# Patient Record
Sex: MALE | URBAN - METROPOLITAN AREA
[De-identification: names, ages, dates, MRNs, and addresses within clinical notes are randomized per-mention and may not be internally consistent; named-entity substitution may affect disease eponyms.]

---

## 2021-04-03 ENCOUNTER — INPATIENT (INPATIENT)
Facility: HOSPITAL | Age: 71
LOS: 1 days | Discharge: HOME | DRG: 82 | End: 2021-04-05
Attending: SPECIALIST | Admitting: SPECIALIST
Payer: MEDICARE

## 2021-04-03 VITALS
HEART RATE: 87 BPM | DIASTOLIC BLOOD PRESSURE: 103 MMHG | WEIGHT: 164.91 LBS | SYSTOLIC BLOOD PRESSURE: 154 MMHG | HEIGHT: 67 IN | RESPIRATION RATE: 18 BRPM | TEMPERATURE: 99 F | OXYGEN SATURATION: 97 %

## 2021-04-03 DIAGNOSIS — I61.9 NONTRAUMATIC INTRACEREBRAL HEMORRHAGE, UNSPECIFIED: ICD-10-CM

## 2021-04-03 LAB
ALBUMIN SERPL ELPH-MCNC: 3.6 G/DL — SIGNIFICANT CHANGE UP (ref 3.3–5)
ALP SERPL-CCNC: 63 U/L — SIGNIFICANT CHANGE UP (ref 40–120)
ALT FLD-CCNC: 23 U/L — SIGNIFICANT CHANGE UP (ref 12–78)
ANION GAP SERPL CALC-SCNC: 3 MMOL/L — LOW (ref 5–17)
APPEARANCE UR: CLEAR — SIGNIFICANT CHANGE UP
AST SERPL-CCNC: 21 U/L — SIGNIFICANT CHANGE UP (ref 15–37)
BASOPHILS # BLD AUTO: 0.06 K/UL — SIGNIFICANT CHANGE UP (ref 0–0.2)
BASOPHILS NFR BLD AUTO: 1.1 % — SIGNIFICANT CHANGE UP (ref 0–2)
BILIRUB SERPL-MCNC: 0.5 MG/DL — SIGNIFICANT CHANGE UP (ref 0.2–1.2)
BILIRUB UR-MCNC: NEGATIVE — SIGNIFICANT CHANGE UP
BUN SERPL-MCNC: 12 MG/DL — SIGNIFICANT CHANGE UP (ref 7–23)
CALCIUM SERPL-MCNC: 8.2 MG/DL — LOW (ref 8.5–10.1)
CHLORIDE SERPL-SCNC: 109 MMOL/L — HIGH (ref 96–108)
CO2 SERPL-SCNC: 28 MMOL/L — SIGNIFICANT CHANGE UP (ref 22–31)
COLOR SPEC: YELLOW — SIGNIFICANT CHANGE UP
CREAT SERPL-MCNC: 0.99 MG/DL — SIGNIFICANT CHANGE UP (ref 0.5–1.3)
DIFF PNL FLD: NEGATIVE — SIGNIFICANT CHANGE UP
EOSINOPHIL # BLD AUTO: 0.15 K/UL — SIGNIFICANT CHANGE UP (ref 0–0.5)
EOSINOPHIL NFR BLD AUTO: 2.7 % — SIGNIFICANT CHANGE UP (ref 0–6)
ETHANOL SERPL-MCNC: <10 MG/DL — SIGNIFICANT CHANGE UP (ref 0–10)
GLUCOSE SERPL-MCNC: 99 MG/DL — SIGNIFICANT CHANGE UP (ref 70–99)
GLUCOSE UR QL: NEGATIVE MG/DL — SIGNIFICANT CHANGE UP
HCT VFR BLD CALC: 42.4 % — SIGNIFICANT CHANGE UP (ref 39–50)
HGB BLD-MCNC: 14.7 G/DL — SIGNIFICANT CHANGE UP (ref 13–17)
IMM GRANULOCYTES NFR BLD AUTO: 0.5 % — SIGNIFICANT CHANGE UP (ref 0–1.5)
KETONES UR-MCNC: NEGATIVE — SIGNIFICANT CHANGE UP
LEUKOCYTE ESTERASE UR-ACNC: NEGATIVE — SIGNIFICANT CHANGE UP
LIDOCAIN IGE QN: 125 U/L — SIGNIFICANT CHANGE UP (ref 73–393)
LYMPHOCYTES # BLD AUTO: 2.69 K/UL — SIGNIFICANT CHANGE UP (ref 1–3.3)
LYMPHOCYTES # BLD AUTO: 48.7 % — HIGH (ref 13–44)
MCHC RBC-ENTMCNC: 31.9 PG — SIGNIFICANT CHANGE UP (ref 27–34)
MCHC RBC-ENTMCNC: 34.7 GM/DL — SIGNIFICANT CHANGE UP (ref 32–36)
MCV RBC AUTO: 92 FL — SIGNIFICANT CHANGE UP (ref 80–100)
MONOCYTES # BLD AUTO: 0.4 K/UL — SIGNIFICANT CHANGE UP (ref 0–0.9)
MONOCYTES NFR BLD AUTO: 7.2 % — SIGNIFICANT CHANGE UP (ref 2–14)
NEUTROPHILS # BLD AUTO: 2.19 K/UL — SIGNIFICANT CHANGE UP (ref 1.8–7.4)
NEUTROPHILS NFR BLD AUTO: 39.8 % — LOW (ref 43–77)
NITRITE UR-MCNC: NEGATIVE — SIGNIFICANT CHANGE UP
PH UR: 8 — SIGNIFICANT CHANGE UP (ref 5–8)
PLATELET # BLD AUTO: 163 K/UL — SIGNIFICANT CHANGE UP (ref 150–400)
POTASSIUM SERPL-MCNC: 3.6 MMOL/L — SIGNIFICANT CHANGE UP (ref 3.5–5.3)
POTASSIUM SERPL-SCNC: 3.6 MMOL/L — SIGNIFICANT CHANGE UP (ref 3.5–5.3)
PROT SERPL-MCNC: 7.3 GM/DL — SIGNIFICANT CHANGE UP (ref 6–8.3)
PROT UR-MCNC: NEGATIVE MG/DL — SIGNIFICANT CHANGE UP
RBC # BLD: 4.61 M/UL — SIGNIFICANT CHANGE UP (ref 4.2–5.8)
RBC # FLD: 12.2 % — SIGNIFICANT CHANGE UP (ref 10.3–14.5)
SARS-COV-2 RNA SPEC QL NAA+PROBE: SIGNIFICANT CHANGE UP
SODIUM SERPL-SCNC: 140 MMOL/L — SIGNIFICANT CHANGE UP (ref 135–145)
SP GR SPEC: 1.01 — SIGNIFICANT CHANGE UP (ref 1.01–1.02)
UROBILINOGEN FLD QL: NEGATIVE MG/DL — SIGNIFICANT CHANGE UP
WBC # BLD: 5.52 K/UL — SIGNIFICANT CHANGE UP (ref 3.8–10.5)
WBC # FLD AUTO: 5.52 K/UL — SIGNIFICANT CHANGE UP (ref 3.8–10.5)

## 2021-04-03 PROCEDURE — 70450 CT HEAD/BRAIN W/O DYE: CPT | Mod: 26,77,59

## 2021-04-03 PROCEDURE — 71260 CT THORAX DX C+: CPT | Mod: 26

## 2021-04-03 PROCEDURE — 36415 COLL VENOUS BLD VENIPUNCTURE: CPT

## 2021-04-03 PROCEDURE — 85610 PROTHROMBIN TIME: CPT

## 2021-04-03 PROCEDURE — 97530 THERAPEUTIC ACTIVITIES: CPT | Mod: GP

## 2021-04-03 PROCEDURE — 71045 X-RAY EXAM CHEST 1 VIEW: CPT | Mod: 26

## 2021-04-03 PROCEDURE — 70496 CT ANGIOGRAPHY HEAD: CPT

## 2021-04-03 PROCEDURE — 86769 SARS-COV-2 COVID-19 ANTIBODY: CPT

## 2021-04-03 PROCEDURE — 72125 CT NECK SPINE W/O DYE: CPT | Mod: 26

## 2021-04-03 PROCEDURE — 80053 COMPREHEN METABOLIC PANEL: CPT

## 2021-04-03 PROCEDURE — 70450 CT HEAD/BRAIN W/O DYE: CPT

## 2021-04-03 PROCEDURE — 70498 CT ANGIOGRAPHY NECK: CPT

## 2021-04-03 PROCEDURE — 99232 SBSQ HOSP IP/OBS MODERATE 35: CPT

## 2021-04-03 PROCEDURE — 97163 PT EVAL HIGH COMPLEX 45 MIN: CPT | Mod: GP

## 2021-04-03 PROCEDURE — 93010 ELECTROCARDIOGRAM REPORT: CPT

## 2021-04-03 PROCEDURE — 85730 THROMBOPLASTIN TIME PARTIAL: CPT

## 2021-04-03 PROCEDURE — 84100 ASSAY OF PHOSPHORUS: CPT

## 2021-04-03 PROCEDURE — 70496 CT ANGIOGRAPHY HEAD: CPT | Mod: 26

## 2021-04-03 PROCEDURE — 97116 GAIT TRAINING THERAPY: CPT | Mod: GP

## 2021-04-03 PROCEDURE — 86900 BLOOD TYPING SEROLOGIC ABO: CPT

## 2021-04-03 PROCEDURE — 72170 X-RAY EXAM OF PELVIS: CPT | Mod: 26

## 2021-04-03 PROCEDURE — C9113: CPT

## 2021-04-03 PROCEDURE — 99221 1ST HOSP IP/OBS SF/LOW 40: CPT

## 2021-04-03 PROCEDURE — 86803 HEPATITIS C AB TEST: CPT

## 2021-04-03 PROCEDURE — 70498 CT ANGIOGRAPHY NECK: CPT | Mod: 26

## 2021-04-03 PROCEDURE — 70450 CT HEAD/BRAIN W/O DYE: CPT | Mod: 26,59

## 2021-04-03 PROCEDURE — 74177 CT ABD & PELVIS W/CONTRAST: CPT | Mod: 26

## 2021-04-03 PROCEDURE — 99291 CRITICAL CARE FIRST HOUR: CPT

## 2021-04-03 PROCEDURE — 86901 BLOOD TYPING SEROLOGIC RH(D): CPT

## 2021-04-03 PROCEDURE — 83735 ASSAY OF MAGNESIUM: CPT

## 2021-04-03 PROCEDURE — 85025 COMPLETE CBC W/AUTO DIFF WBC: CPT

## 2021-04-03 PROCEDURE — 86850 RBC ANTIBODY SCREEN: CPT

## 2021-04-03 RX ORDER — SODIUM CHLORIDE 9 MG/ML
500 INJECTION INTRAMUSCULAR; INTRAVENOUS; SUBCUTANEOUS ONCE
Refills: 0 | Status: COMPLETED | OUTPATIENT
Start: 2021-04-03 | End: 2021-04-03

## 2021-04-03 RX ORDER — ACETAMINOPHEN 500 MG
1000 TABLET ORAL ONCE
Refills: 0 | Status: COMPLETED | OUTPATIENT
Start: 2021-04-03 | End: 2021-04-03

## 2021-04-03 RX ORDER — ACETAMINOPHEN 500 MG
1000 TABLET ORAL ONCE
Refills: 0 | Status: DISCONTINUED | OUTPATIENT
Start: 2021-04-03 | End: 2021-04-05

## 2021-04-03 RX ORDER — ONDANSETRON 8 MG/1
4 TABLET, FILM COATED ORAL EVERY 6 HOURS
Refills: 0 | Status: DISCONTINUED | OUTPATIENT
Start: 2021-04-03 | End: 2021-04-05

## 2021-04-03 RX ORDER — SODIUM CHLORIDE 9 MG/ML
1000 INJECTION INTRAMUSCULAR; INTRAVENOUS; SUBCUTANEOUS
Refills: 0 | Status: DISCONTINUED | OUTPATIENT
Start: 2021-04-03 | End: 2021-04-04

## 2021-04-03 RX ORDER — PANTOPRAZOLE SODIUM 20 MG/1
40 TABLET, DELAYED RELEASE ORAL DAILY
Refills: 0 | Status: DISCONTINUED | OUTPATIENT
Start: 2021-04-03 | End: 2021-04-05

## 2021-04-03 RX ORDER — SENNA PLUS 8.6 MG/1
2 TABLET ORAL AT BEDTIME
Refills: 0 | Status: DISCONTINUED | OUTPATIENT
Start: 2021-04-03 | End: 2021-04-05

## 2021-04-03 RX ADMIN — PANTOPRAZOLE SODIUM 40 MILLIGRAM(S): 20 TABLET, DELAYED RELEASE ORAL at 16:29

## 2021-04-03 RX ADMIN — Medication 1 TABLET(S): at 19:24

## 2021-04-03 RX ADMIN — SODIUM CHLORIDE 75 MILLILITER(S): 9 INJECTION INTRAMUSCULAR; INTRAVENOUS; SUBCUTANEOUS at 16:29

## 2021-04-03 RX ADMIN — Medication 400 MILLIGRAM(S): at 15:00

## 2021-04-03 RX ADMIN — Medication 1000 MILLIGRAM(S): at 15:15

## 2021-04-03 RX ADMIN — SODIUM CHLORIDE 500 MILLILITER(S): 9 INJECTION INTRAMUSCULAR; INTRAVENOUS; SUBCUTANEOUS at 13:55

## 2021-04-03 RX ADMIN — SODIUM CHLORIDE 500 MILLILITER(S): 9 INJECTION INTRAMUSCULAR; INTRAVENOUS; SUBCUTANEOUS at 15:01

## 2021-04-03 RX ADMIN — Medication 1000 MILLIGRAM(S): at 15:30

## 2021-04-03 RX ADMIN — Medication 400 MILLIGRAM(S): at 21:25

## 2021-04-03 RX ADMIN — Medication 1000 MILLIGRAM(S): at 21:55

## 2021-04-03 NOTE — ED PROVIDER NOTE - OBJECTIVE STATEMENT
69 y/o male presents to the ED s/p fall. Per EMS, pt fell off a 6 foot ladder with +LOC. Report was that he was alert upon arrival to ED. Trauma alert activated and pt taken urgently to CAT scan which is + for bleed. 69 y/o male with PMHx of HTN presents to the ED s/p fall. Per EMS, pt fell off a 6 foot ladder with +LOC. Report was that he was alert upon arrival to ED. Trauma alert activated and pt taken urgently to CAT scan which is + for bleed. Pt is here with complaints of posterior head pain which is described as dull. Pt is unsure what happened, and does not remember anything after falling. Not on any blood thinners. Is on HTN medications. Denies any Hx of cancer. Denies nausea/vomiting, changes to vision, numbness/tingling in hands/feet. PCP: Dr. Chacho Urias in [place]. NKDA.  Mandarin interpret used, id#[id number].  Pt daughter who speaks English can be reached at: [phone].

## 2021-04-03 NOTE — H&P ADULT - NSHPPHYSICALEXAM_GEN_ALL_CORE
Vital Signs Last 24 Hrs  T(C): 37 (03 Apr 2021 13:33), Max: 37 (03 Apr 2021 13:33)  T(F): 98.6 (03 Apr 2021 13:33), Max: 98.6 (03 Apr 2021 13:33)  HR: 93 (03 Apr 2021 15:01) (87 - 93)  BP: 160/110 (03 Apr 2021 15:01) (154/103 - 160/110)  BP(mean): 123 (03 Apr 2021 15:01) (123 - 123)  RR: 18 (03 Apr 2021 15:01) (18 - 18)  SpO2: 98% (03 Apr 2021 15:01) (97% - 98%)    Constitutional: awake and alert, NAD   HEENT: PERRLA, EOMI  Neck: Supple  Respiratory: Breath sounds are clear bilaterally  Cardiovascular: S1 and S2, regular rhythm  Gastrointestinal: soft, nontender  Extremities:  no edema  Vascular: Carotid Bruit - no  Musculoskeletal: no joint swelling/tenderness, no abnormal movements  Skin: small abrasion to the back of his head -- occiput     Neurological exam:  HF: A x O x 3. Appropriately interactive, normal affect. fluent speech in Mandrin,  no Aphasia or paraphasic errors. Naming/repetition intact   CN: SALOME, EOMI, VFF, facial sensation normal, no NLFD, tongue midline  Motor: No pronator drift, Strength 5/5 in all 4 ext, normal bulk and tone, no tremor, rigidity or bradykinesia.    Sens: Intact to light touch  Reflexes: Symmetric and normal . BJ 2+, BR 2+, KJ 2+, AJ 2+, downgoing toes b/l  Coord:  No FNFA, dysmetria, LAUREL intact   Gait/Balance: Not tested

## 2021-04-03 NOTE — PATIENT PROFILE ADULT - PUBLIC BENEFITS
PHYSICIAN NEXT STEPS:  Review Only    CHIEF COMPLAINT:  Chief Complaint/Protocol Used: No Contact or Duplicate Contact Call  Onset: n/a      ASSESSMENT:  ? Onset: n/a  ? Normal True  -------------------------------------------------------    DISPOSITION:  Disposition Recommendation: Duplicate Contact Calls  Questions that led to disposition:  ? Caller has already spoken with another triager and has no further questions.  Patient Directed To: Unspecified  Patient Intended Action: Other        DISPOSITION OVERRIDE/PROVIDER CONSULT:  Disposition Override: N/A  Override Source: Unspecified  Consulted with PCP: No  Consulted with On-Call Physician: No    CALLER CONTACT INFO:  Name: Stacie Solis (Self)  Phone 1: [phone] (Home Phone)  Phone 2: [phone] (Mobile)      ENCOUNTER STARTED:  11/14/20 11:29:40 AM  ENCOUNTER ASSIGNED TO/CLOSED BY:  Rafael Brandt @ 11/14/20 11:31:13 AM      -------------------------------------------------------    UNDERSTANDS CARE ADVICE: Yes    AGREES WITH CARE ADVICE: Yes    WILL FOLLOW CARE ADVICE: Yes    -------------------------------------------------------   no

## 2021-04-03 NOTE — H&P ADULT - NSHPLABSRESULTS_GEN_ALL_CORE
< from: CT Head No Cont (04.03.21 @ 14:06) >  Multiple abnormal foci of intraparenchymal hemorrhage including focal hemorrhage in the right globus pallidus with surrounding edema and mass effect (5:22), parafalcine and left tentorial leaflet subdural hemorrhage, as well as multiple and temporal hemorrhagic contusions with subarachnoid hemorrhage and abnormal increased attenuation hemorrhage along the right M1 MCA branch vessels, MRI MRA and/or CTA may better assess.    Volume loss, microvascular disease, right parafalcine 8 x 5.9 mm slight increased attenuation may reflect posttraumatic hemorrhage, mass not excluded. Nonspecific white matter decreased attenuation likely microvascular disease, MRI more sensitive for new ischemia. If symptoms persist consider follow-up head CT or MRI if no contraindication.    Straightened cervical lordosis with patchy areas of lucencies and sclerosis throughout the vertebra, with right paraspinal soft tissue swelling, no prevertebral soft tissue swelling. Multilevel degenerative change, as detailed above, canal contents are suboptimally evaluated on CT, MRI may be obtained for persistence of spine pain as clinically warranted.    04-03    140  |  109<H>  |  12  ----------------------------<  99  3.6   |  28  |  0.99                          14.7   5.52  )-----------( 163      ( 03 Apr 2021 13:43 )             42.4

## 2021-04-03 NOTE — ED ADULT NURSE NOTE - OBJECTIVE STATEMENT
BIBA s/p fall off 6ft ladder, unwitnessed, possible LOC, denies blood thinners. see trauma flow sheet

## 2021-04-03 NOTE — ED ADULT NURSE NOTE - NSIMPLEMENTINTERV_GEN_ALL_ED
Implemented All Fall Risk Interventions:  [place] to call system. Call bell, personal items and telephone within reach. Instruct patient to call for assistance. Room bathroom lighting operational. Non-slip footwear when patient is off stretcher. Physically safe environment: no spills, clutter or unnecessary equipment. Stretcher in lowest position, wheels locked, appropriate side rails in place. Provide visual cue, wrist band, yellow gown, etc. Monitor gait and stability. Monitor for mental status changes and reorient to person, place, and time. Review medications for side effects contributing to fall risk. Reinforce activity limits and safety measures with patient and family.

## 2021-04-03 NOTE — H&P ADULT - ASSESSMENT
70 year old man with a h/o HTN  presents to the ED s/p unwitnessed fall from a ladder     CT head shows multiple intraparencymal hemorrhages with mass effect and surrounding cerebral edema  Left tentoral subdural hematoma  multiple temporal contusions with SAH    #TBI  -CTA of the head now to better assess right M1 MCA branch vessels  -Repeat CT in 4 hours   -Admit to the ICU under Dr. Fierro's service   -Q1 Hour neuro checks   -Keep NPO   -Daughter is reroute with pts medications, will update Med rec at that time   -thus far daughter and patient denies any use of asa/plavix/coumadin/DOAC medication  -Venodynes for DVT PPX

## 2021-04-03 NOTE — ED PROVIDER NOTE - CRITICAL CARE ATTENDING CONTRIBUTION TO CARE
patient s/p fall from a 6 foot ladder with reported loc. TA activated. + bleed. no AC. full code. Nsx at bedside. will admit to ICU,

## 2021-04-03 NOTE — PROGRESS NOTE ADULT - MENTAL STATUS
- Refractive goal: [facility] gcs 15 moves all extremiteis strongly

## 2021-04-03 NOTE — ED PROVIDER NOTE - PROGRESS NOTE DETAILS
Abrahamibtania HUANG for Dr. Cortes  Neurosurgery team at bedside. Recommending CTA head/neck. Will order and admit to Dr. Fierro, neurosurgery ICU. Corina HUANG for Dr. Cortes   Spoke to pt daughter to update her with pt status.

## 2021-04-03 NOTE — H&P ADULT - HISTORY OF PRESENT ILLNESS
Neuro surgery called to see the patient at 2:10PM after the ED attending got the preliminary read on the CT head  No Code Trauma was called   Pt seen at 2:25PM with the ED attending at bedside   GCS 15 at initial exam E:4, V:5, M:6  Dr. Fierro contacted at 2:20 to review the CT imaging     Pt is a 70 year old man with a PMH of HTN who presented to the ED after a fall off a ladder.   Patient has limited English and the [place]  phone was used.   Pt was interviewed with ED attending.  Pt does not remember the event leading up to his hospitalization.  Daughter was called, [phone], she states her father was trying to remove a tree from the sidewalk and that was why he was on the ladder.   Fall was not witnessed. Unknown LOC.     On exam patient is awake and alert, he follows commands, he c/o pain to the back of his head and his left hip.   He denies nausea or vomiting. He denies visual changes. He denies numbness or weakness.   GCS 15

## 2021-04-03 NOTE — ED PROVIDER NOTE - CLINICAL SUMMARY MEDICAL DECISION MAKING FREE TEXT BOX
71 y/o male with fall and intracranial bleeding. no ton blood thinners. Full code. AAOx3 and no neurological deficits currently. Neurosurgical evaluation and pain control.

## 2021-04-03 NOTE — ED ADULT TRIAGE NOTE - CHIEF COMPLAINT QUOTE
pt presents to ED s/p fall off 6 foot ladder with + LOC. pt denies blood thinners. complaints of posterior head pain. TA called at 1333.

## 2021-04-03 NOTE — ED PROVIDER NOTE - PMH
No pertinent past medical history <<----- Click to add NO pertinent Past Medical History HTN (hypertension)

## 2021-04-04 LAB
ALBUMIN SERPL ELPH-MCNC: 3.2 G/DL — LOW (ref 3.3–5)
ALP SERPL-CCNC: 62 U/L — SIGNIFICANT CHANGE UP (ref 40–120)
ALT FLD-CCNC: 19 U/L — SIGNIFICANT CHANGE UP (ref 12–78)
ANION GAP SERPL CALC-SCNC: 8 MMOL/L — SIGNIFICANT CHANGE UP (ref 5–17)
AST SERPL-CCNC: 18 U/L — SIGNIFICANT CHANGE UP (ref 15–37)
BASOPHILS # BLD AUTO: 0.02 K/UL — SIGNIFICANT CHANGE UP (ref 0–0.2)
BASOPHILS NFR BLD AUTO: 0.2 % — SIGNIFICANT CHANGE UP (ref 0–2)
BILIRUB SERPL-MCNC: 1.1 MG/DL — SIGNIFICANT CHANGE UP (ref 0.2–1.2)
BUN SERPL-MCNC: 13 MG/DL — SIGNIFICANT CHANGE UP (ref 7–23)
CALCIUM SERPL-MCNC: 7.8 MG/DL — LOW (ref 8.5–10.1)
CHLORIDE SERPL-SCNC: 108 MMOL/L — SIGNIFICANT CHANGE UP (ref 96–108)
CO2 SERPL-SCNC: 23 MMOL/L — SIGNIFICANT CHANGE UP (ref 22–31)
COVID-19 SPIKE DOMAIN AB INTERP: NEGATIVE — SIGNIFICANT CHANGE UP
COVID-19 SPIKE DOMAIN ANTIBODY RESULT: 0.4 U/ML — SIGNIFICANT CHANGE UP
CREAT SERPL-MCNC: 0.83 MG/DL — SIGNIFICANT CHANGE UP (ref 0.5–1.3)
EOSINOPHIL # BLD AUTO: 0.01 K/UL — SIGNIFICANT CHANGE UP (ref 0–0.5)
EOSINOPHIL NFR BLD AUTO: 0.1 % — SIGNIFICANT CHANGE UP (ref 0–6)
GLUCOSE SERPL-MCNC: 90 MG/DL — SIGNIFICANT CHANGE UP (ref 70–99)
HCT VFR BLD CALC: 38.7 % — LOW (ref 39–50)
HCV AB S/CO SERPL IA: 0.07 S/CO — SIGNIFICANT CHANGE UP (ref 0–0.99)
HCV AB SERPL-IMP: SIGNIFICANT CHANGE UP
HGB BLD-MCNC: 13.7 G/DL — SIGNIFICANT CHANGE UP (ref 13–17)
IMM GRANULOCYTES NFR BLD AUTO: 0.2 % — SIGNIFICANT CHANGE UP (ref 0–1.5)
LYMPHOCYTES # BLD AUTO: 1.78 K/UL — SIGNIFICANT CHANGE UP (ref 1–3.3)
LYMPHOCYTES # BLD AUTO: 19.3 % — SIGNIFICANT CHANGE UP (ref 13–44)
MAGNESIUM SERPL-MCNC: 2.1 MG/DL — SIGNIFICANT CHANGE UP (ref 1.6–2.6)
MCHC RBC-ENTMCNC: 31.9 PG — SIGNIFICANT CHANGE UP (ref 27–34)
MCHC RBC-ENTMCNC: 35.4 GM/DL — SIGNIFICANT CHANGE UP (ref 32–36)
MCV RBC AUTO: 90.2 FL — SIGNIFICANT CHANGE UP (ref 80–100)
MONOCYTES # BLD AUTO: 0.55 K/UL — SIGNIFICANT CHANGE UP (ref 0–0.9)
MONOCYTES NFR BLD AUTO: 6 % — SIGNIFICANT CHANGE UP (ref 2–14)
NEUTROPHILS # BLD AUTO: 6.85 K/UL — SIGNIFICANT CHANGE UP (ref 1.8–7.4)
NEUTROPHILS NFR BLD AUTO: 74.2 % — SIGNIFICANT CHANGE UP (ref 43–77)
PHOSPHATE SERPL-MCNC: 2.7 MG/DL — SIGNIFICANT CHANGE UP (ref 2.5–4.5)
PLATELET # BLD AUTO: 148 K/UL — LOW (ref 150–400)
POTASSIUM SERPL-MCNC: 3.6 MMOL/L — SIGNIFICANT CHANGE UP (ref 3.5–5.3)
POTASSIUM SERPL-SCNC: 3.6 MMOL/L — SIGNIFICANT CHANGE UP (ref 3.5–5.3)
PROT SERPL-MCNC: 6.5 GM/DL — SIGNIFICANT CHANGE UP (ref 6–8.3)
RBC # BLD: 4.29 M/UL — SIGNIFICANT CHANGE UP (ref 4.2–5.8)
RBC # FLD: 12 % — SIGNIFICANT CHANGE UP (ref 10.3–14.5)
SARS-COV-2 IGG+IGM SERPL QL IA: 0.4 U/ML — SIGNIFICANT CHANGE UP
SARS-COV-2 IGG+IGM SERPL QL IA: NEGATIVE — SIGNIFICANT CHANGE UP
SODIUM SERPL-SCNC: 139 MMOL/L — SIGNIFICANT CHANGE UP (ref 135–145)
WBC # BLD: 9.23 K/UL — SIGNIFICANT CHANGE UP (ref 3.8–10.5)
WBC # FLD AUTO: 9.23 K/UL — SIGNIFICANT CHANGE UP (ref 3.8–10.5)

## 2021-04-04 PROCEDURE — 99232 SBSQ HOSP IP/OBS MODERATE 35: CPT

## 2021-04-04 RX ORDER — ACETAMINOPHEN 500 MG
1000 TABLET ORAL ONCE
Refills: 0 | Status: COMPLETED | OUTPATIENT
Start: 2021-04-04 | End: 2021-04-04

## 2021-04-04 RX ADMIN — Medication 400 MILLIGRAM(S): at 06:12

## 2021-04-04 RX ADMIN — Medication 1 TABLET(S): at 10:27

## 2021-04-04 RX ADMIN — Medication 1000 MILLIGRAM(S): at 06:32

## 2021-04-04 RX ADMIN — PANTOPRAZOLE SODIUM 40 MILLIGRAM(S): 20 TABLET, DELAYED RELEASE ORAL at 10:27

## 2021-04-04 RX ADMIN — Medication 1000 MILLIGRAM(S): at 10:50

## 2021-04-04 RX ADMIN — Medication 400 MILLIGRAM(S): at 10:27

## 2021-04-04 RX ADMIN — SODIUM CHLORIDE 75 MILLILITER(S): 9 INJECTION INTRAMUSCULAR; INTRAVENOUS; SUBCUTANEOUS at 05:58

## 2021-04-04 NOTE — PHYSICAL THERAPY INITIAL EVALUATION ADULT - GAIT DEVIATIONS NOTED, PT EVAL
decreased bright/increased time in double stance/decreased velocity of limb motion/decreased step length/decreased stride length/decreased swing-to-stance ratio/decreased weight-shifting ability

## 2021-04-04 NOTE — PROGRESS NOTE ADULT - SUBJECTIVE AND OBJECTIVE BOX
Patient is a 70y old  Male who presents with a chief complaint of Traumatic Brain Injury (04 Apr 2021 10:29)      HPI:  Neuro surgery called to see the patient at 2:10PM after the ED attending got the preliminary read on the CT head. No Code Trauma was called. Pt seen at 2:25PM with the ED attending at bedside. GCS 15 at initial exam E:4, V:5, M:6. Dr. Fierro contacted at 2:20 to review the CT imaging. Pt is a 70 year old man with a PMH of HTN who presented to the ED after a fall off a ladder. Patient has limited English and the [place]  phone was used. Pt was interviewed with ED attending. Pt does not remember the event leading up to his hospitalization. Daughter was called, [phone], she states her father was trying to remove a tree from the sidewalk and that was why he was on the ladder. Fall was not witnessed. Unknown LOC. On exam patient is awake and alert, he follows commands, he c/o pain to the back of his head and his left hip. He denies nausea or vomiting. He denies visual changes. He denies numbness or weakness. Ct head sig for multiple small IPH, scattered SAH, falx/tent SDH    4/4 - Pt seen earlier today, sitting up in bed eating breakfast in NAD. Rpt CT head stable. No overnight events      acetaminophen  IVPB .. 1000 milliGRAM(s) IV Intermittent once PRN  multivitamin 1 Tablet(s) Oral daily  ondansetron Injectable 4 milliGRAM(s) IV Push every 6 hours PRN  pantoprazole  Injectable 40 milliGRAM(s) IV Push daily  senna 2 Tablet(s) Oral at bedtime PRN      Vital Signs Last 24 Hrs  T(C): 36.8 (04 Apr 2021 06:15), Max: 37.6 (03 Apr 2021 17:00)  T(F): 98.2 (04 Apr 2021 06:15), Max: 99.7 (03 Apr 2021 17:00)  HR: 89 (04 Apr 2021 11:00) (87 - 104)  BP: 138/70 (04 Apr 2021 11:00) (106/68 - 160/110)  BP(mean): 88 (04 Apr 2021 11:00) (73 - 123)  RR: 15 (04 Apr 2021 11:00) (11 - 28)  SpO2: 99% (04 Apr 2021 11:00) (93% - 100%)        Constitutional: awake and alert, NAD   Neck: Supple  Musculoskeletal: no abnormal movements    Neurological exam as per RN using  phone:  HF: A x O x 3. Appropriately interactive, normal affect. fluent speech in Mandrin   CN: SALOME, EOMI,facial sensation normal, no NLFD, tongue midline  Motor: No pronator drift, Strength 5/5 in all 4 ext, normal bulk and tone, no tremor, rigidity or bradykinesia.    Sens: Intact to light touch  Gait/Balance: Not tested                            13.7   9.23  )-----------( 148      ( 04 Apr 2021 06:35 )             38.7     139  |  108  |  13  ----------------------------<  90  3.6   |  23  |  0.83    Ca    7.8<L>      04 Apr 2021 06:35  Phos  2.7     04-04  Mg     2.1     04-04    TPro  6.5  /  Alb  3.2<L>  /  TBili  1.1  /  DBili  x   /  AST  18  /  ALT  19  /  AlkPhos  62  04-04    PT/INR - ( 03 Apr 2021 15:32 )   PT: 11.5 sec;   INR: 0.98 ratio      PTT - ( 03 Apr 2021 15:32 )  PTT:29.8 sec      Radiology:  CT Head No Cont (04.03.21 @ 20:55) >  Small focus of high attenuation involving the right temporal region is identified. The rest of this study appears unchanged when compared with the prior exam.  
HPI:     Pt is a 70 year old man with a PMH of HTN who presented to the ED after a fall off a ladder.   Pt does not remember the event leading up to his hospitalization. Fall was not witnessed. Unknown LOC. In the ED patient, CT head showed right basal ganglia bleed, with subdural hematoma, cta showed no abnormality. Repeat CT head 4/3 8Pm revealed no acute change from prior imaging.      4/4 Today patient was seen at bedside. Patient is awake and alert and follows commands. Patient complaining pain to the back of his head and his left hip. Patient reports wanting to eat today. He denies nausea or vomiting. He denies visual changes. He denies numbness or weakness.       REVIEW OF SYSTEMS:    CONSTITUTIONAL: No weakness, fevers or chills  EYES/ENT: No visual changes;  No vertigo or throat pain   NECK: Pin point Occipital pain. Denies stiffness  RESPIRATORY: No cough, wheezing, hemoptysis; No shortness of breath  CARDIOVASCULAR: No chest pain or palpitations  GASTROINTESTINAL: No abdominal or epigastric pain. No nausea, vomiting, or hematemesis; No diarrhea or constipation. No melena or hematochezia.  GENITOURINARY: No dysuria, frequency or hematuria  NEUROLOGICAL: No numbness or weakness  SKIN: No itching, rashes    MEDICATIONS  (STANDING):  multivitamin 1 Tablet(s) Oral daily  pantoprazole  Injectable 40 milliGRAM(s) IV Push daily  sodium chloride 0.9%. 1000 milliLiter(s) (75 mL/Hr) IV Continuous <Continuous>    MEDICATIONS  (PRN):  acetaminophen  IVPB .. 1000 milliGRAM(s) IV Intermittent once PRN Moderate Pain (4 - 6)  ondansetron Injectable 4 milliGRAM(s) IV Push every 6 hours PRN Nausea and/or Vomiting  senna 2 Tablet(s) Oral at bedtime PRN Constipation      Height (cm): 170.2 (04-03 @ 13:33)  Weight (kg): 74.8 (04-03 @ 13:33)  BMI (kg/m2): 25.8 (04-03 @ 13:33)    ICU Vital Signs Last 24 Hrs  T(C): 36.8 (04 Apr 2021 06:15), Max: 37.6 (03 Apr 2021 17:00)  T(F): 98.2 (04 Apr 2021 06:15), Max: 99.7 (03 Apr 2021 17:00)  HR: 90 (04 Apr 2021 10:00) (87 - 104)  BP: 106/68 (04 Apr 2021 10:00) (106/68 - 160/110)  BP(mean): 76 (04 Apr 2021 10:00) (73 - 123)  RR: 14 (04 Apr 2021 10:00) (11 - 28)  SpO2: 99% (04 Apr 2021 10:00) (93% - 100%)  I&O's Summary                    `             13.7   9.23  )-----------( 148      ( 04 Apr 2021 06:35 )             38.7         `04-04    139  |  108  |  13  ----------------------------<  90  3.6   |  23  |  0.83    Ca    7.8<L>      04 Apr 2021 06:35  Phos  2.7     04-04  Mg     2.1     04-04    TPro  6.5  /  Alb  3.2<L>  /  TBili  1.1  /  DBili  x   /  AST  18  /  ALT  19  /  AlkPhos  62  04-04          DVT Prophylaxis:  Venodynes                                                               Advanced Directives: full code                     
asked to see patient by Dr. Fierro    HPI:     Pt is a 70 year old man with a PMH of HTN who presented to the ED after a fall off a ladder.   Pt does not remember the event leading up to his hospitalization.  Daughter was called, [phone], she states her father was trying to remove a tree from the sidewalk and that was why he was on the ladder.   Fall was not witnessed. Unknown LOC.    patient is awake and alert, he follows commands, he c/o pain to the back of his head and his left hip.   He denies nausea or vomiting. He denies visual changes. He denies numbness or weakness.   GCS 15   CT head showed right basal ganglia bleed, with subdural hematoma, cta showed no abnormality    Review of Systems:  Other Review of Systems: All other review of systems negative, except as noted in HPI       MEDICATIONS  (STANDING):  multivitamin 1 Tablet(s) Oral daily  pantoprazole  Injectable 40 milliGRAM(s) IV Push daily  sodium chloride 0.9%. 1000 milliLiter(s) (75 mL/Hr) IV Continuous <Continuous>    MEDICATIONS  (PRN):  acetaminophen  IVPB .. 1000 milliGRAM(s) IV Intermittent once PRN Moderate Pain (4 - 6)  ondansetron Injectable 4 milliGRAM(s) IV Push every 6 hours PRN Nausea and/or Vomiting  senna 2 Tablet(s) Oral at bedtime PRN Constipation      Height (cm): 170.2 ( @ 13:33)  Weight (kg): 74.8 ( @ 13:33)  BMI (kg/m2): 25.8 ( @ 13:33)    ICU Vital Signs Last 24 Hrs  T(C): 37 (2021 13:33), Max: 37 (2021 13:33)  T(F): 98.6 (2021 13:33), Max: 98.6 (2021 13:33)  HR: 93 (2021 15:01) (87 - 93)  BP: 160/110 (2021 15:01) (154/103 - 160/110)  BP(mean): 123 (2021 15:01) (123 - 123)  ABP: --  ABP(mean): --  RR: 18 (2021 15:01) (18 - 18)  SpO2: 98% (2021 15:01) (97% - 98%)    I&O's Summary                        14.7   5.52  )-----------( 163      ( 2021 13:43 )             42.4       04-03    140  |  109<H>  |  12  ----------------------------<  99  3.6   |  28  |  0.99    Ca    8.2<L>      2021 13:43    TPro  7.3  /  Alb  3.6  /  TBili  0.5  /  DBili  x   /  AST  21  /  ALT  23  /  AlkPhos  63  04-03    rinalysis Basic - ( 2021 13:43 )    Color: Yellow / Appearance: Clear / S.010 / pH: x  Gluc: x / Ketone: Negative  / Bili: Negative / Urobili: Negative mg/dL   Blood: x / Protein: Negative mg/dL / Nitrite: Negative   Leuk Esterase: Negative / RBC: x / WBC x   Sq Epi: x / Non Sq Epi: x / Bacteria: x        DVT Prophylaxis:  Venodynes                                                               Advanced Directives: full code

## 2021-04-04 NOTE — PHYSICAL THERAPY INITIAL EVALUATION ADULT - GENERAL OBSERVATIONS, REHAB EVAL
Pt received resting in bed. Mandarin only. Vitals monitored in ICU. This PT speaks Mandarin. Translated for RN. RN reported fall caused ICH. Pt can be transferred out of ICU and possibly home tomorrow. Pt was able to walk near bed. - Sharpened Romberg EC. Pt complained of R posterior head discomfort. Contusion with scab. Scant blood stain on pillow case. Pt complained of ear ringing/discomfort. Explained to pt that he had a temporal contusion.

## 2021-04-04 NOTE — PHYSICAL THERAPY INITIAL EVALUATION ADULT - PERTINENT HX OF CURRENT PROBLEM, REHAB EVAL
Pt was up on ladder trimming a tree branch damaged by a truck. Wife found him on the ground. +LOC. CT R basal ganglia bleed, temporal contusion, SDH, SAH.

## 2021-04-04 NOTE — PHYSICAL THERAPY INITIAL EVALUATION ADULT - IMPAIRMENTS FOUND, PT EVAL
aerobic capacity/endurance/gait, locomotion, and balance/gross motor/integumentary integrity/muscle strength

## 2021-04-04 NOTE — PHYSICAL THERAPY INITIAL EVALUATION ADULT - PLANNED THERAPY INTERVENTIONS, PT EVAL
balance training/bed mobility training/gait training/lumbar stabilization/manual therapy techniques/motor coordination training/neuromuscular re-education/ROM/strengthening/stretching/transfer training

## 2021-04-04 NOTE — PROGRESS NOTE ADULT - ASSESSMENT
70 year old man PMH of HTN who presented to the ED after a fall off a ladder. Pt is amnestic to event. As per daughter, her father was trying to remove a tree from the sidewalk when he fell. Fall was not witnessed, ? LOC. GCS 15 in ER. CT head sig for multiple small IPH, scattered SAH, falx/tent SDH, stable on follow up scan    - No acute neurosurgical intervention   - Neuros q 8 hours  - Vitals q 4 hours  - Tylenol for pain  - Advance diet  - May transfer to floor  - Possible d/c home in AM  - Danita    Discussed with Dr Fierro  
70 year old man with a h/o HTN  presents to the ED s/p unwitnessed fall from a ladder       Neuro  ICH 2/2 to fall  -CT head on admission showed multiple intraparenchymal hemorrhages with mass effect and surrounding cerebral edema. Left tentorial subdural hematoma, multiple temporal contusions with SAH  -CTA no occlusions.  -Repeat head ct 4/3 @ 8 pm was unchanged. Neurochecks unchanged since admission. GCS 15  -Advance diet today  -BP well controlled  this AM  -Order PT  -Coags WNL  -Consider ordering MRI    Cardio  -HTN- Controlled     GI  Cont PPI PRn for nausea/vomiting      -RFTS Unremarkable      DVT Proph- venodynes    Dispo: Medically stable for Downgrade s/p Neurosurgery evaluation     
70 year old man with a h/o HTN  presents to the ED s/p unwitnessed fall from a ladder     CT head shows multiple intraparencymal hemorrhages with mass effect and surrounding cerebral edema  Left tentoral subdural hematoma  multiple temporal contusions with SAH  cta no occlusions  repeat head ct at 6 pm  monitor bp  npoi  venodynes  Patient was not on blood thinners

## 2021-04-05 VITALS
RESPIRATION RATE: 18 BRPM | DIASTOLIC BLOOD PRESSURE: 93 MMHG | OXYGEN SATURATION: 99 % | HEART RATE: 85 BPM | SYSTOLIC BLOOD PRESSURE: 132 MMHG | TEMPERATURE: 98 F

## 2021-04-05 PROCEDURE — 99238 HOSP IP/OBS DSCHRG MGMT 30/<: CPT

## 2021-04-05 RX ORDER — ACETAMINOPHEN 500 MG
975 TABLET ORAL EVERY 6 HOURS
Refills: 0 | Status: DISCONTINUED | OUTPATIENT
Start: 2021-04-05 | End: 2021-04-05

## 2021-04-05 RX ORDER — ACETAMINOPHEN 500 MG
3 TABLET ORAL
Qty: 0 | Refills: 0 | DISCHARGE
Start: 2021-04-05

## 2021-04-05 RX ADMIN — PANTOPRAZOLE SODIUM 40 MILLIGRAM(S): 20 TABLET, DELAYED RELEASE ORAL at 08:58

## 2021-04-05 RX ADMIN — Medication 1 TABLET(S): at 08:57

## 2021-04-05 NOTE — DISCHARGE NOTE NURSING/CASE MANAGEMENT/SOCIAL WORK - PATIENT PORTAL LINK FT
You can access the FollowMyHealth Patient Portal offered by [facility] by registering at the following website: [email or web address]. By joining [facility]’s FollowMyHealth portal, you will also be able to view your health information using other applications (apps) compatible with our system.

## 2021-04-05 NOTE — DISCHARGE NOTE PROVIDER - HOSPITAL COURSE
Neurosurgery called to see the patient at 2:10PM after the ED attending got the preliminary read on the CT head. No Code Trauma was called. Pt seen at 2:25PM with the ED attending at bedside. GCS 15 at initial exam E:4, V:5, M:6. Dr. Fierro contacted at 2:20 to review the CT imaging. Pt is a 70 year old Mandarin speaking man with a PMH of HTN who presented to the ED after a fall off a ladder. Patient has limited English and the [place]  phone was used. Pt was interviewed with ED attending. Pt does not remember the event leading up to his hospitalization. Daughter was called, [phone], she states her father was trying to remove a tree from the sidewalk and that was why he was on the ladder. Fall was not witnessed. Unknown LOC. On exam patient is awake and alert, he follows commands, he c/o pain to the back of his head and his left hip. He denies nausea or vomiting. He denies visual changes. He denies numbness or weakness. Ct head sig for multiple small IPH, scattered SAH, falx/tent SDH. Patient was admitted to ICU for observation, repeat Head CT scan that evening was stable. Transferred to floor without issue yesterday. He is voiding, tolerating diet. Ambulating with physical therapy, recommending discharge home.  ID#[id number] Patient complaining of headache, mild neck pain. Denies visual changes, nausea/vomiting, weakness. Patient is stable from neurosurgical standpoint for discharge.

## 2021-04-05 NOTE — DISCHARGE NOTE PROVIDER - NSDCCPCAREPLAN_GEN_ALL_CORE_FT
PRINCIPAL DISCHARGE DIAGNOSIS  Diagnosis: Intracerebral hemorrhage  Assessment and Plan of Treatment: Please follow up with Dr. Fierro in 2 weeks with follow up Head CT scan. Please call the office, or visit the nearest ER if you experience severe headaches, lethargy, vomiting or weakness. Activity as tolerated, avoid strenuous activity,, do not drive at this time.

## 2021-04-05 NOTE — DISCHARGE NOTE PROVIDER - CARE PROVIDER_API CALL
Farzad Fierro; PhD)  Neurosurgery  [address], [address]nd [place] [address]  Phone: [phone]  Fax: [phone]  Follow Up Time: 2 weeks

## 2021-04-10 DIAGNOSIS — S06.309A UNSPECIFIED FOCAL TRAUMATIC BRAIN INJURY WITH LOSS OF CONSCIOUSNESS OF UNSPECIFIED DURATION, INITIAL ENCOUNTER: ICD-10-CM

## 2021-04-10 DIAGNOSIS — R40.2142 COMA SCALE, EYES OPEN, SPONTANEOUS, AT ARRIVAL TO EMERGENCY DEPARTMENT: ICD-10-CM

## 2021-04-10 DIAGNOSIS — R40.2252 COMA SCALE, BEST VERBAL RESPONSE, ORIENTED, AT ARRIVAL TO EMERGENCY DEPARTMENT: ICD-10-CM
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
## 2021-04-10 DIAGNOSIS — I10 ESSENTIAL (PRIMARY) HYPERTENSION: ICD-10-CM

## 2021-04-10 DIAGNOSIS — G93.6 CEREBRAL EDEMA: ICD-10-CM

## 2021-04-10 DIAGNOSIS — R40.2362 COMA SCALE, BEST MOTOR RESPONSE, OBEYS COMMANDS, AT ARRIVAL TO EMERGENCY DEPARTMENT: ICD-10-CM
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
## 2021-04-13 PROBLEM — Z00.00 ENCOUNTER FOR PREVENTIVE HEALTH EXAMINATION: Status: ACTIVE | Noted: 2021-04-13
